# Patient Record
Sex: MALE | Race: WHITE | NOT HISPANIC OR LATINO | Employment: FULL TIME | URBAN - METROPOLITAN AREA
[De-identification: names, ages, dates, MRNs, and addresses within clinical notes are randomized per-mention and may not be internally consistent; named-entity substitution may affect disease eponyms.]

---

## 2017-07-20 ENCOUNTER — APPOINTMENT (OUTPATIENT)
Dept: GENERAL RADIOLOGY | Facility: HOSPITAL | Age: 52
End: 2017-07-20

## 2017-07-20 ENCOUNTER — HOSPITAL ENCOUNTER (EMERGENCY)
Facility: HOSPITAL | Age: 52
Discharge: HOME | End: 2017-07-20
Attending: EMERGENCY MEDICINE | Admitting: EMERGENCY MEDICINE

## 2017-07-20 ENCOUNTER — APPOINTMENT (OUTPATIENT)
Dept: CT IMAGING | Facility: HOSPITAL | Age: 52
End: 2017-07-20

## 2017-07-20 VITALS
HEART RATE: 105 BPM | RESPIRATION RATE: 14 BRPM | WEIGHT: 245 LBS | OXYGEN SATURATION: 97 % | TEMPERATURE: 97.5 F | DIASTOLIC BLOOD PRESSURE: 110 MMHG | SYSTOLIC BLOOD PRESSURE: 164 MMHG | BODY MASS INDEX: 34.3 KG/M2 | HEIGHT: 71 IN

## 2017-07-20 DIAGNOSIS — S22.31XA CLOSED FRACTURE OF ONE RIB OF RIGHT SIDE, INITIAL ENCOUNTER: ICD-10-CM

## 2017-07-20 DIAGNOSIS — I10 ESSENTIAL HYPERTENSION: ICD-10-CM

## 2017-07-20 DIAGNOSIS — Z91.14 NON COMPLIANCE W MEDICATION REGIMEN: ICD-10-CM

## 2017-07-20 DIAGNOSIS — S42.031A CLOSED DISPLACED FRACTURE OF ACROMIAL END OF RIGHT CLAVICLE, INITIAL ENCOUNTER: Primary | ICD-10-CM

## 2017-07-20 PROCEDURE — 99283 EMERGENCY DEPT VISIT LOW MDM: CPT

## 2017-07-20 PROCEDURE — 70450 CT HEAD/BRAIN W/O DYE: CPT

## 2017-07-20 PROCEDURE — 71101 X-RAY EXAM UNILAT RIBS/CHEST: CPT

## 2017-07-20 PROCEDURE — 73030 X-RAY EXAM OF SHOULDER: CPT

## 2017-07-20 RX ORDER — HYDROCODONE BITARTRATE AND ACETAMINOPHEN 5; 325 MG/1; MG/1
1 TABLET ORAL EVERY 4 HOURS PRN
Qty: 16 TABLET | Refills: 0 | Status: SHIPPED | OUTPATIENT
Start: 2017-07-20

## 2017-07-20 NOTE — ED PROVIDER NOTES
The patient presents complaining of right shoulder pain due to a mechanical fall that occurred last night upon intoxification. Pt denies SOB, N/V, or abdominal pain. No head trauma.    Limited physical exam:  Patient is nontoxic appearing NAD  Lungs/cardiovascular: pt's heart is RRR, no murmur, pt's lungs clear  Abdomen: pt's abdomen is soft and non-tender  Back/extremities: Right shoulder tenderness/ swelling, pain w/ ROM of the right shoulder  Distal motor neuro intact    X ray shows distal Clavicle fracture- will sling and follow up with Ortho.        I supervised care provided by the midlevel provider.  We have discussed this patient's history, physical exam, and treatment plan.  I have reviewed the note and personally saw and examined the patient and agree with the plan of care.         Julio Cesar Soria  07/20/17 0918       Jose Luis Abbott MD  07/20/17 0978     Documentation assistance provided by lisa Soria.  Information recorded by the scribe was done at my direction and has been verified and validated by me.       Jose Luis Abbott MD  08/26/17 [id number]

## 2017-07-20 NOTE — ED TRIAGE NOTES
Patient reports trip and fall late last night.  Complains of right shoulder pain, reproducible with movement.  Denies head or neck trauma.